# Patient Record
Sex: MALE | URBAN - METROPOLITAN AREA
[De-identification: names, ages, dates, MRNs, and addresses within clinical notes are randomized per-mention and may not be internally consistent; named-entity substitution may affect disease eponyms.]

---

## 2020-03-23 ENCOUNTER — APPOINTMENT (RX ONLY)
Dept: URBAN - METROPOLITAN AREA CLINIC 114 | Facility: CLINIC | Age: 55
Setting detail: DERMATOLOGY
End: 2020-03-23

## 2020-03-23 DIAGNOSIS — L40.0 PSORIASIS VULGARIS: ICD-10-CM

## 2020-03-23 PROCEDURE — 99242 OFF/OP CONSLTJ NEW/EST SF 20: CPT

## 2020-03-23 PROCEDURE — ? COUNSELING

## 2020-03-23 PROCEDURE — ? DIAGNOSIS COMMENT

## 2020-03-23 ASSESSMENT — LOCATION ZONE DERM: LOCATION ZONE: HAND

## 2020-03-23 ASSESSMENT — LOCATION SIMPLE DESCRIPTION DERM
LOCATION SIMPLE: LEFT HAND
LOCATION SIMPLE: RIGHT HAND

## 2020-03-23 ASSESSMENT — LOCATION DETAILED DESCRIPTION DERM
LOCATION DETAILED: LEFT RADIAL PALM
LOCATION DETAILED: RIGHT RADIAL PALM

## 2020-03-23 NOTE — PROCEDURE: DIAGNOSIS COMMENT
Comment: VS psoriasis. Pt reports chronic back pain, but work up negative for psoriatic arthritis. Pt uses previously prescribed clobetasol ointment on hands with occlusion at night. Will start  tar ointment, handout provided. Pt specifically sent for possible XTRAC and will obtain approval but advised due to COVID-19, may be delayed.
Detail Level: Simple

## 2020-03-23 NOTE — PROCEDURE: MIPS QUALITY
Quality 431: Preventive Care And Screening: Unhealthy Alcohol Use - Screening: Patient screened for unhealthy alcohol use using a single question and scores less than 2 times per year
Quality 130: Documentation Of Current Medications In The Medical Record: Current Medications Documented
Quality 226: Preventive Care And Screening: Tobacco Use: Screening And Cessation Intervention: Patient screened for tobacco use and is an ex/non-smoker
Detail Level: Detailed
Quality 110: Preventive Care And Screening: Influenza Immunization: Influenza Immunization previously received during influenza season
Quality 131: Pain Assessment And Follow-Up: Pain assessment using a standardized tool is documented as negative, no follow-up plan required

## 2020-09-04 ENCOUNTER — APPOINTMENT (RX ONLY)
Dept: URBAN - METROPOLITAN AREA CLINIC 114 | Facility: CLINIC | Age: 55
Setting detail: DERMATOLOGY
End: 2020-09-04

## 2020-09-04 DIAGNOSIS — L40.0 PSORIASIS VULGARIS: ICD-10-CM

## 2020-09-04 PROCEDURE — ? EXCIMER LASER

## 2020-09-04 PROCEDURE — 96920 EXCIMER LSR PSRIASIS<250SQCM: CPT

## 2020-09-04 ASSESSMENT — LOCATION DETAILED DESCRIPTION DERM: LOCATION DETAILED: LEFT ULNAR DORSAL HAND

## 2020-09-04 ASSESSMENT — LOCATION ZONE DERM: LOCATION ZONE: HAND

## 2020-09-04 ASSESSMENT — LOCATION SIMPLE DESCRIPTION DERM: LOCATION SIMPLE: LEFT HAND

## 2020-09-04 NOTE — PROCEDURE: EXCIMER LASER
Total Square Area In Cm2 (Required For Proper Billing- Whole Numbers Only Please): 156
Post-Care Instructions: I reviewed with the patient in detail post-care instructions. Patient should stay away from the sun and wear sun protection until treated areas are fully healed.
Location #1: fingertips, fingernails
Spot Size: 2 x 2 cm
Comments: [id number]
Fluence Units: mJ/cm2
Detail Level: Detailed
Consent: Written consent obtained, risks reviewed including but not limited to crusting, scabbing, blistering, scarring, darker or lighter pigmentary change, incidental hair removal, bruising, and/or incomplete removal. Patient has a chronic skin condition and failure to continue treatments may lead to localized complications and/or the need for systemic immunosuppressants. Mask worn by both patient and staff. Patient screened negative for COVID-19 symptoms.
Treatment Number: 1
Mode: repeat paint
Fluence #1 (J/Cm2 Or Mj/Cm2): 300

## 2020-09-09 ENCOUNTER — APPOINTMENT (RX ONLY)
Dept: URBAN - METROPOLITAN AREA CLINIC 114 | Facility: CLINIC | Age: 55
Setting detail: DERMATOLOGY
End: 2020-09-09

## 2020-09-09 DIAGNOSIS — L40.0 PSORIASIS VULGARIS: ICD-10-CM

## 2020-09-09 PROCEDURE — ? EXCIMER LASER

## 2020-09-09 PROCEDURE — 96920 EXCIMER LSR PSRIASIS<250SQCM: CPT

## 2020-09-09 ASSESSMENT — LOCATION SIMPLE DESCRIPTION DERM: LOCATION SIMPLE: LEFT HAND

## 2020-09-09 ASSESSMENT — LOCATION ZONE DERM: LOCATION ZONE: HAND

## 2020-09-09 ASSESSMENT — LOCATION DETAILED DESCRIPTION DERM: LOCATION DETAILED: LEFT ULNAR DORSAL HAND

## 2020-09-09 NOTE — PROCEDURE: EXCIMER LASER
Total Square Area In Cm2 (Required For Proper Billing- Whole Numbers Only Please): 156
Post-Care Instructions: I reviewed with the patient in detail post-care instructions. Patient should stay away from the sun and wear sun protection until treated areas are fully healed.
Location #1: fingertips, fingernails (+10%)
Spot Size: 2 x 2 cm
Comments: [id number]
Fluence Units: mJ/cm2
Detail Level: Detailed
Consent: Risks reviewed including but not limited to crusting, scabbing, blistering, scarring, darker or lighter pigmentary change, incidental hair removal, bruising, and/or incomplete removal. Patient has a chronic skin condition and failure to continue treatments may lead to localized complications and/or the need for systemic immunosuppressants. Mask worn by both patient and staff. Patient screened negative for COVID-19 symptoms.
Treatment Number: 2
Mode: repeat paint
Fluence #1 (J/Cm2 Or Mj/Cm2): 330

## 2020-09-11 ENCOUNTER — APPOINTMENT (RX ONLY)
Dept: URBAN - METROPOLITAN AREA CLINIC 114 | Facility: CLINIC | Age: 55
Setting detail: DERMATOLOGY
End: 2020-09-11

## 2020-09-11 DIAGNOSIS — L40.0 PSORIASIS VULGARIS: ICD-10-CM

## 2020-09-11 PROCEDURE — 96920 EXCIMER LSR PSRIASIS<250SQCM: CPT

## 2020-09-11 PROCEDURE — ? EXCIMER LASER

## 2020-09-11 ASSESSMENT — LOCATION ZONE DERM: LOCATION ZONE: HAND

## 2020-09-11 ASSESSMENT — LOCATION DETAILED DESCRIPTION DERM: LOCATION DETAILED: LEFT ULNAR DORSAL HAND

## 2020-09-11 ASSESSMENT — LOCATION SIMPLE DESCRIPTION DERM: LOCATION SIMPLE: LEFT HAND

## 2020-09-11 NOTE — PROCEDURE: EXCIMER LASER
Total Square Area In Cm2 (Required For Proper Billing- Whole Numbers Only Please): 156
Post-Care Instructions: I reviewed with the patient in detail post-care instructions. Patient should stay away from the sun and wear sun protection until treated areas are fully healed.
Location #1: fingertips, fingernails (+10%)
Spot Size: 2 x 2 cm
Comments: [id number]
Fluence Units: mJ/cm2
Detail Level: Detailed
Consent: Risks reviewed including but not limited to crusting, scabbing, blistering, scarring, darker or lighter pigmentary change, incidental hair removal, bruising, and/or incomplete removal. Patient has a chronic skin condition and failure to continue treatments may lead to localized complications and/or the need for systemic immunosuppressants. Mask worn by both patient and staff. Patient screened negative for COVID-19 symptoms.
Treatment Number: 3
Mode: repeat paint
Fluence #1 (J/Cm2 Or Mj/Cm2): 363

## 2020-09-15 ENCOUNTER — APPOINTMENT (RX ONLY)
Dept: URBAN - METROPOLITAN AREA CLINIC 114 | Facility: CLINIC | Age: 55
Setting detail: DERMATOLOGY
End: 2020-09-15

## 2020-09-15 DIAGNOSIS — L40.0 PSORIASIS VULGARIS: ICD-10-CM

## 2020-09-15 PROCEDURE — 96920 EXCIMER LSR PSRIASIS<250SQCM: CPT

## 2020-09-15 PROCEDURE — ? EXCIMER LASER

## 2020-09-15 ASSESSMENT — LOCATION SIMPLE DESCRIPTION DERM: LOCATION SIMPLE: LEFT HAND

## 2020-09-15 ASSESSMENT — LOCATION DETAILED DESCRIPTION DERM: LOCATION DETAILED: LEFT ULNAR DORSAL HAND

## 2020-09-15 ASSESSMENT — LOCATION ZONE DERM: LOCATION ZONE: HAND

## 2020-09-15 NOTE — PROCEDURE: EXCIMER LASER
Total Square Area In Cm2 (Required For Proper Billing- Whole Numbers Only Please): 156
Post-Care Instructions: I reviewed with the patient in detail post-care instructions. Patient should stay away from the sun and wear sun protection until treated areas are fully healed.
Location #1: fingertips, fingernails (+10%)
Spot Size: 2 x 2 cm
Comments: [id number]
Fluence Units: mJ/cm2
Detail Level: Detailed
Consent: Risks reviewed including but not limited to crusting, scabbing, blistering, scarring, darker or lighter pigmentary change, incidental hair removal, bruising, and/or incomplete removal. Patient has a chronic skin condition and failure to continue treatments may lead to localized complications and/or the need for systemic immunosuppressants. Mask worn by both patient and staff. Patient screened negative for COVID-19 symptoms.
Treatment Number: 4
Mode: repeat paint
Fluence #1 (J/Cm2 Or Mj/Cm2): 399

## 2020-09-17 ENCOUNTER — APPOINTMENT (RX ONLY)
Dept: URBAN - METROPOLITAN AREA CLINIC 114 | Facility: CLINIC | Age: 55
Setting detail: DERMATOLOGY
End: 2020-09-17

## 2020-09-17 DIAGNOSIS — L40.0 PSORIASIS VULGARIS: ICD-10-CM

## 2020-09-17 PROCEDURE — ? EXCIMER LASER

## 2020-09-17 PROCEDURE — 96920 EXCIMER LSR PSRIASIS<250SQCM: CPT

## 2020-09-17 ASSESSMENT — LOCATION ZONE DERM: LOCATION ZONE: HAND

## 2020-09-17 ASSESSMENT — LOCATION SIMPLE DESCRIPTION DERM: LOCATION SIMPLE: LEFT HAND

## 2020-09-17 ASSESSMENT — LOCATION DETAILED DESCRIPTION DERM: LOCATION DETAILED: LEFT ULNAR DORSAL HAND

## 2020-09-17 NOTE — PROCEDURE: EXCIMER LASER
Total Square Area In Cm2 (Required For Proper Billing- Whole Numbers Only Please): 156
Post-Care Instructions: I reviewed with the patient in detail post-care instructions. Patient should stay away from the sun and wear sun protection until treated areas are fully healed.
Location #1: fingertips, fingernails (+10%)
Spot Size: 2 x 2 cm
Comments: [email or web address]
Fluence Units: mJ/cm2
Detail Level: Detailed
Consent: Risks reviewed including but not limited to crusting, scabbing, blistering, scarring, darker or lighter pigmentary change, incidental hair removal, bruising, and/or incomplete removal. Patient has a chronic skin condition and failure to continue treatments may lead to localized complications and/or the need for systemic immunosuppressants. Mask worn by both patient and staff. Patient screened negative for COVID-19 symptoms.
Treatment Number: 5
Mode: repeat paint
Fluence #1 (J/Cm2 Or Mj/Cm2): 432

## 2020-09-22 ENCOUNTER — APPOINTMENT (RX ONLY)
Dept: URBAN - METROPOLITAN AREA CLINIC 114 | Facility: CLINIC | Age: 55
Setting detail: DERMATOLOGY
End: 2020-09-22

## 2020-09-22 DIAGNOSIS — L40.0 PSORIASIS VULGARIS: ICD-10-CM

## 2020-09-22 PROCEDURE — ? EXCIMER LASER

## 2020-09-22 PROCEDURE — 96920 EXCIMER LSR PSRIASIS<250SQCM: CPT

## 2020-09-22 ASSESSMENT — LOCATION DETAILED DESCRIPTION DERM: LOCATION DETAILED: LEFT ULNAR DORSAL HAND

## 2020-09-22 ASSESSMENT — LOCATION SIMPLE DESCRIPTION DERM: LOCATION SIMPLE: LEFT HAND

## 2020-09-22 ASSESSMENT — LOCATION ZONE DERM: LOCATION ZONE: HAND

## 2020-09-22 NOTE — PROCEDURE: EXCIMER LASER
Total Square Area In Cm2 (Required For Proper Billing- Whole Numbers Only Please): 156
Post-Care Instructions: I reviewed with the patient in detail post-care instructions. Patient should stay away from the sun and wear sun protection until treated areas are fully healed.
Location #1: fingertips, fingernails (+10%)
Spot Size: 2 x 2 cm
Comments: [email or web address]
Fluence Units: mJ/cm2
Detail Level: Detailed
Consent: Risks reviewed including but not limited to crusting, scabbing, blistering, scarring, darker or lighter pigmentary change, incidental hair removal, bruising, and/or incomplete removal. Patient has a chronic skin condition and failure to continue treatments may lead to localized complications and/or the need for systemic immunosuppressants. Mask worn by both patient and staff. Patient screened negative for COVID-19 symptoms.
Treatment Number: 6
Mode: repeat paint
Fluence #1 (J/Cm2 Or Mj/Cm2): 483

## 2020-09-24 ENCOUNTER — APPOINTMENT (RX ONLY)
Dept: URBAN - METROPOLITAN AREA CLINIC 114 | Facility: CLINIC | Age: 55
Setting detail: DERMATOLOGY
End: 2020-09-24

## 2020-09-24 DIAGNOSIS — L40.0 PSORIASIS VULGARIS: ICD-10-CM

## 2020-09-24 PROCEDURE — ? EXCIMER LASER

## 2020-09-24 PROCEDURE — 96920 EXCIMER LSR PSRIASIS<250SQCM: CPT

## 2020-09-24 ASSESSMENT — LOCATION DETAILED DESCRIPTION DERM: LOCATION DETAILED: LEFT ULNAR DORSAL HAND

## 2020-09-24 ASSESSMENT — LOCATION SIMPLE DESCRIPTION DERM: LOCATION SIMPLE: LEFT HAND

## 2020-09-24 ASSESSMENT — LOCATION ZONE DERM: LOCATION ZONE: HAND

## 2020-09-24 NOTE — PROCEDURE: EXCIMER LASER
Total Square Area In Cm2 (Required For Proper Billing- Whole Numbers Only Please): 156
Post-Care Instructions: I reviewed with the patient in detail post-care instructions. Patient should stay away from the sun and wear sun protection until treated areas are fully healed.
Location #1: fingertips, fingernails (+10%)
Spot Size: 2 x 2 cm
Comments: [email or web address]
Fluence Units: mJ/cm2
Detail Level: Detailed
Consent: Risks reviewed including but not limited to crusting, scabbing, blistering, scarring, darker or lighter pigmentary change, incidental hair removal, bruising, and/or incomplete removal. Patient has a chronic skin condition and failure to continue treatments may lead to localized complications and/or the need for systemic immunosuppressants. Mask worn by both patient and staff. Patient screened negative for COVID-19 symptoms.
Treatment Number: 7
Mode: repeat paint
Fluence #1 (J/Cm2 Or Mj/Cm2): 530

## 2020-09-29 ENCOUNTER — APPOINTMENT (RX ONLY)
Dept: URBAN - METROPOLITAN AREA CLINIC 114 | Facility: CLINIC | Age: 55
Setting detail: DERMATOLOGY
End: 2020-09-29

## 2020-09-29 DIAGNOSIS — L40.0 PSORIASIS VULGARIS: ICD-10-CM

## 2020-09-29 PROCEDURE — 96920 EXCIMER LSR PSRIASIS<250SQCM: CPT

## 2020-09-29 PROCEDURE — ? EXCIMER LASER

## 2020-09-29 ASSESSMENT — LOCATION ZONE DERM: LOCATION ZONE: HAND

## 2020-09-29 ASSESSMENT — LOCATION SIMPLE DESCRIPTION DERM: LOCATION SIMPLE: LEFT HAND

## 2020-09-29 ASSESSMENT — LOCATION DETAILED DESCRIPTION DERM: LOCATION DETAILED: LEFT ULNAR DORSAL HAND

## 2020-09-29 NOTE — PROCEDURE: EXCIMER LASER
Total Square Area In Cm2 (Required For Proper Billing- Whole Numbers Only Please): 156
Post-Care Instructions: I reviewed with the patient in detail post-care instructions. Patient should stay away from the sun and wear sun protection until treated areas are fully healed.
Location #1: fingertips, fingernails (+10%)
Spot Size: 2 x 2 cm
Comments: [email or web address]
Fluence Units: mJ/cm2
Detail Level: Detailed
Consent: Risks reviewed including but not limited to crusting, scabbing, blistering, scarring, darker or lighter pigmentary change, incidental hair removal, bruising, and/or incomplete removal. Patient has a chronic skin condition and failure to continue treatments may lead to localized complications and/or the need for systemic immunosuppressants. Mask worn by both patient and staff. Patient screened negative for COVID-19 symptoms.
Treatment Number: 8
Mode: repeat paint
Fluence #1 (J/Cm2 Or Mj/Cm2): 580

## 2020-10-01 ENCOUNTER — APPOINTMENT (RX ONLY)
Dept: URBAN - METROPOLITAN AREA CLINIC 114 | Facility: CLINIC | Age: 55
Setting detail: DERMATOLOGY
End: 2020-10-01

## 2020-10-01 DIAGNOSIS — L40.0 PSORIASIS VULGARIS: ICD-10-CM

## 2020-10-01 PROCEDURE — 96920 EXCIMER LSR PSRIASIS<250SQCM: CPT

## 2020-10-01 PROCEDURE — ? EXCIMER LASER

## 2020-10-01 ASSESSMENT — LOCATION ZONE DERM: LOCATION ZONE: HAND

## 2020-10-01 ASSESSMENT — LOCATION SIMPLE DESCRIPTION DERM: LOCATION SIMPLE: LEFT HAND

## 2020-10-01 ASSESSMENT — LOCATION DETAILED DESCRIPTION DERM: LOCATION DETAILED: LEFT ULNAR DORSAL HAND

## 2020-10-06 ENCOUNTER — APPOINTMENT (RX ONLY)
Dept: URBAN - METROPOLITAN AREA CLINIC 114 | Facility: CLINIC | Age: 55
Setting detail: DERMATOLOGY
End: 2020-10-06

## 2020-10-06 DIAGNOSIS — L40.0 PSORIASIS VULGARIS: ICD-10-CM

## 2020-10-06 PROCEDURE — ? EXCIMER LASER

## 2020-10-06 PROCEDURE — 96920 EXCIMER LSR PSRIASIS<250SQCM: CPT

## 2020-10-06 ASSESSMENT — LOCATION ZONE DERM: LOCATION ZONE: HAND

## 2020-10-06 ASSESSMENT — LOCATION SIMPLE DESCRIPTION DERM: LOCATION SIMPLE: LEFT HAND

## 2020-10-06 ASSESSMENT — LOCATION DETAILED DESCRIPTION DERM: LOCATION DETAILED: LEFT ULNAR DORSAL HAND

## 2020-10-06 NOTE — PROCEDURE: EXCIMER LASER
Total Square Area In Cm2 (Required For Proper Billing- Whole Numbers Only Please): 156
Post-Care Instructions: I reviewed with the patient in detail post-care instructions. Patient should stay away from the sun and wear sun protection until treated areas are fully healed.
Location #1: fingertips, fingernails (+10%)
Spot Size: 2 x 2 cm
Comments: [email or web address]
Fluence Units: mJ/cm2
Detail Level: Detailed
Consent: Risks reviewed including but not limited to crusting, scabbing, blistering, scarring, darker or lighter pigmentary change, incidental hair removal, bruising, and/or incomplete removal. Patient has a chronic skin condition and failure to continue treatments may lead to localized complications and/or the need for systemic immunosuppressants. Mask worn by both patient and staff. Patient screened negative for COVID-19 symptoms.
Treatment Number: 11
Mode: repeat paint
Fluence #1 (J/Cm2 Or Mj/Cm2): 700

## 2020-10-08 ENCOUNTER — APPOINTMENT (RX ONLY)
Dept: URBAN - METROPOLITAN AREA CLINIC 114 | Facility: CLINIC | Age: 55
Setting detail: DERMATOLOGY
End: 2020-10-08

## 2020-10-08 DIAGNOSIS — L40.0 PSORIASIS VULGARIS: ICD-10-CM

## 2020-10-08 PROCEDURE — ? EXCIMER LASER

## 2020-10-08 PROCEDURE — 96920 EXCIMER LSR PSRIASIS<250SQCM: CPT

## 2020-10-08 ASSESSMENT — LOCATION DETAILED DESCRIPTION DERM: LOCATION DETAILED: LEFT ULNAR DORSAL HAND

## 2020-10-08 ASSESSMENT — LOCATION SIMPLE DESCRIPTION DERM: LOCATION SIMPLE: LEFT HAND

## 2020-10-08 ASSESSMENT — LOCATION ZONE DERM: LOCATION ZONE: HAND

## 2020-10-08 NOTE — PROCEDURE: EXCIMER LASER
Total Square Area In Cm2 (Required For Proper Billing- Whole Numbers Only Please): 156
Post-Care Instructions: I reviewed with the patient in detail post-care instructions. Patient should stay away from the sun and wear sun protection until treated areas are fully healed.
Location #1: fingertips, fingernails (+5%)
Spot Size: 2 x 2 cm
Comments: [email or web address]
Fluence Units: mJ/cm2
Detail Level: Detailed
Consent: Risks reviewed including but not limited to crusting, scabbing, blistering, scarring, darker or lighter pigmentary change, incidental hair removal, bruising, and/or incomplete removal. Patient has a chronic skin condition and failure to continue treatments may lead to localized complications and/or the need for systemic immunosuppressants. Mask worn by both patient and staff. Patient screened negative for COVID-19 symptoms.
Treatment Number: 12
Mode: repeat paint
Fluence #1 (J/Cm2 Or Mj/Cm2): 749

## 2020-10-13 ENCOUNTER — APPOINTMENT (RX ONLY)
Dept: URBAN - METROPOLITAN AREA CLINIC 114 | Facility: CLINIC | Age: 55
Setting detail: DERMATOLOGY
End: 2020-10-13

## 2020-10-13 DIAGNOSIS — L40.0 PSORIASIS VULGARIS: ICD-10-CM

## 2020-10-13 PROCEDURE — ? EXCIMER LASER

## 2020-10-13 PROCEDURE — 96920 EXCIMER LSR PSRIASIS<250SQCM: CPT

## 2020-10-13 ASSESSMENT — LOCATION SIMPLE DESCRIPTION DERM: LOCATION SIMPLE: LEFT HAND

## 2020-10-13 ASSESSMENT — LOCATION DETAILED DESCRIPTION DERM: LOCATION DETAILED: LEFT ULNAR DORSAL HAND

## 2020-10-13 ASSESSMENT — LOCATION ZONE DERM: LOCATION ZONE: HAND

## 2020-10-13 NOTE — PROCEDURE: EXCIMER LASER
Total Square Area In Cm2 (Required For Proper Billing- Whole Numbers Only Please): 156
Post-Care Instructions: I reviewed with the patient in detail post-care instructions. Patient should stay away from the sun and wear sun protection until treated areas are fully healed.
Location #1: fingertips, fingernails (+10%)
Spot Size: 2 x 2 cm
Comments: [id number]
Fluence Units: mJ/cm2
Detail Level: Detailed
Consent: Risks reviewed including but not limited to crusting, scabbing, blistering, scarring, darker or lighter pigmentary change, incidental hair removal, bruising, and/or incomplete removal. Patient has a chronic skin condition and failure to continue treatments may lead to localized complications and/or the need for systemic immunosuppressants. Mask worn by both patient and staff. Patient screened negative for COVID-19 symptoms.
Treatment Number: 13
Mode: repeat paint
Fluence #1 (J/Cm2 Or Mj/Cm2): 810

## 2020-10-15 ENCOUNTER — APPOINTMENT (RX ONLY)
Dept: URBAN - METROPOLITAN AREA CLINIC 114 | Facility: CLINIC | Age: 55
Setting detail: DERMATOLOGY
End: 2020-10-15

## 2020-10-15 DIAGNOSIS — L40.0 PSORIASIS VULGARIS: ICD-10-CM

## 2020-10-15 PROCEDURE — 96920 EXCIMER LSR PSRIASIS<250SQCM: CPT

## 2020-10-15 PROCEDURE — ? EXCIMER LASER

## 2020-10-15 ASSESSMENT — LOCATION ZONE DERM: LOCATION ZONE: HAND

## 2020-10-15 ASSESSMENT — LOCATION DETAILED DESCRIPTION DERM: LOCATION DETAILED: LEFT ULNAR DORSAL HAND

## 2020-10-15 ASSESSMENT — LOCATION SIMPLE DESCRIPTION DERM: LOCATION SIMPLE: LEFT HAND

## 2020-10-15 NOTE — PROCEDURE: EXCIMER LASER
Total Square Area In Cm2 (Required For Proper Billing- Whole Numbers Only Please): 156
Post-Care Instructions: I reviewed with the patient in detail post-care instructions. Patient should stay away from the sun and wear sun protection until treated areas are fully healed.
Location #1: fingertips, fingernails
Spot Size: 2 x 2 cm
Comments: [email or web address]
Fluence Units: mJ/cm2
Detail Level: Detailed
Consent: Risks reviewed including but not limited to crusting, scabbing, blistering, scarring, darker or lighter pigmentary change, incidental hair removal, bruising, and/or incomplete removal. Patient has a chronic skin condition and failure to continue treatments may lead to localized complications and/or the need for systemic immunosuppressants. Mask worn by both patient and staff. Patient screened negative for COVID-19 symptoms.
Treatment Number: 14
Mode: repeat paint
Fluence #1 (J/Cm2 Or Mj/Cm2): 816

## 2020-10-20 ENCOUNTER — APPOINTMENT (RX ONLY)
Dept: URBAN - METROPOLITAN AREA CLINIC 114 | Facility: CLINIC | Age: 55
Setting detail: DERMATOLOGY
End: 2020-10-20

## 2020-10-20 DIAGNOSIS — L40.0 PSORIASIS VULGARIS: ICD-10-CM

## 2020-10-20 PROCEDURE — ? EXCIMER LASER

## 2020-10-20 PROCEDURE — 96920 EXCIMER LSR PSRIASIS<250SQCM: CPT

## 2020-10-20 ASSESSMENT — LOCATION SIMPLE DESCRIPTION DERM: LOCATION SIMPLE: LEFT HAND

## 2020-10-20 ASSESSMENT — LOCATION DETAILED DESCRIPTION DERM: LOCATION DETAILED: LEFT ULNAR DORSAL HAND

## 2020-10-20 ASSESSMENT — LOCATION ZONE DERM: LOCATION ZONE: HAND

## 2020-10-20 NOTE — PROCEDURE: EXCIMER LASER
Total Square Area In Cm2 (Required For Proper Billing- Whole Numbers Only Please): 156
Post-Care Instructions: I reviewed with the patient in detail post-care instructions. Patient should stay away from the sun and wear sun protection until treated areas are fully healed.
Location #1: fingertips, fingernails
Spot Size: 2 x 2 cm
Comments: [email or web address]
Fluence Units: mJ/cm2
Detail Level: Detailed
Consent: Risks reviewed including but not limited to crusting, scabbing, blistering, scarring, darker or lighter pigmentary change, incidental hair removal, bruising, and/or incomplete removal. Patient has a chronic skin condition and failure to continue treatments may lead to localized complications and/or the need for systemic immunosuppressants. Mask worn by both patient and staff. Patient screened negative for COVID-19 symptoms.
Treatment Number: 15
Mode: repeat paint
Fluence #1 (J/Cm2 Or Mj/Cm2): 81

## 2020-10-22 ENCOUNTER — APPOINTMENT (RX ONLY)
Dept: URBAN - METROPOLITAN AREA CLINIC 114 | Facility: CLINIC | Age: 55
Setting detail: DERMATOLOGY
End: 2020-10-22

## 2020-10-22 DIAGNOSIS — L40.0 PSORIASIS VULGARIS: ICD-10-CM

## 2020-10-22 PROCEDURE — ? EXCIMER LASER

## 2020-10-22 PROCEDURE — 96920 EXCIMER LSR PSRIASIS<250SQCM: CPT

## 2020-10-22 ASSESSMENT — LOCATION ZONE DERM: LOCATION ZONE: HAND

## 2020-10-22 ASSESSMENT — LOCATION DETAILED DESCRIPTION DERM: LOCATION DETAILED: LEFT ULNAR DORSAL HAND

## 2020-10-22 ASSESSMENT — LOCATION SIMPLE DESCRIPTION DERM: LOCATION SIMPLE: LEFT HAND

## 2020-10-22 NOTE — PROCEDURE: EXCIMER LASER
Total Square Area In Cm2 (Required For Proper Billing- Whole Numbers Only Please): 156
Post-Care Instructions: I reviewed with the patient in detail post-care instructions. Patient should stay away from the sun and wear sun protection until treated areas are fully healed.
Location #1: fingertips, fingernails (+5%)
Spot Size: 2 x 2 cm
Comments: [id number]
Fluence Units: mJ/cm2
Detail Level: Detailed
Consent: Risks reviewed including but not limited to crusting, scabbing, blistering, scarring, darker or lighter pigmentary change, incidental hair removal, bruising, and/or incomplete removal. Patient has a chronic skin condition and failure to continue treatments may lead to localized complications and/or the need for systemic immunosuppressants. Mask worn by both patient and staff. Patient screened negative for COVID-19 symptoms.
Treatment Number: 16
Mode: repeat paint
Fluence #1 (J/Cm2 Or Mj/Cm2): 855

## 2020-10-27 ENCOUNTER — APPOINTMENT (RX ONLY)
Dept: URBAN - METROPOLITAN AREA CLINIC 114 | Facility: CLINIC | Age: 55
Setting detail: DERMATOLOGY
End: 2020-10-27

## 2020-10-27 DIAGNOSIS — L40.0 PSORIASIS VULGARIS: ICD-10-CM

## 2020-10-27 PROCEDURE — ? EXCIMER LASER

## 2020-10-27 PROCEDURE — 96920 EXCIMER LSR PSRIASIS<250SQCM: CPT

## 2020-10-27 ASSESSMENT — LOCATION SIMPLE DESCRIPTION DERM: LOCATION SIMPLE: LEFT HAND

## 2020-10-27 ASSESSMENT — LOCATION DETAILED DESCRIPTION DERM: LOCATION DETAILED: LEFT ULNAR DORSAL HAND

## 2020-10-27 ASSESSMENT — LOCATION ZONE DERM: LOCATION ZONE: HAND

## 2020-10-27 NOTE — PROCEDURE: EXCIMER LASER
Total Square Area In Cm2 (Required For Proper Billing- Whole Numbers Only Please): 156
Post-Care Instructions: I reviewed with the patient in detail post-care instructions. Patient should stay away from the sun and wear sun protection until treated areas are fully healed.
Location #1: fingertips, fingernails (+5%)
Spot Size: 2 x 2 cm
Comments: [email or web address]
Fluence Units: mJ/cm2
Detail Level: Detailed
Consent: Risks reviewed including but not limited to crusting, scabbing, blistering, scarring, darker or lighter pigmentary change, incidental hair removal, bruising, and/or incomplete removal. Patient has a chronic skin condition and failure to continue treatments may lead to localized complications and/or the need for systemic immunosuppressants. Mask worn by both patient and staff. Patient screened negative for COVID-19 symptoms.
Treatment Number: 17
Mode: repeat paint
Fluence #1 (J/Cm2 Or Mj/Cm2): 893

## 2020-10-29 ENCOUNTER — APPOINTMENT (RX ONLY)
Dept: URBAN - METROPOLITAN AREA CLINIC 114 | Facility: CLINIC | Age: 55
Setting detail: DERMATOLOGY
End: 2020-10-29

## 2020-10-29 DIAGNOSIS — L40.0 PSORIASIS VULGARIS: ICD-10-CM

## 2020-10-29 PROCEDURE — 96920 EXCIMER LSR PSRIASIS<250SQCM: CPT

## 2020-10-29 PROCEDURE — ? EXCIMER LASER

## 2020-10-29 ASSESSMENT — LOCATION ZONE DERM: LOCATION ZONE: HAND

## 2020-10-29 ASSESSMENT — LOCATION SIMPLE DESCRIPTION DERM: LOCATION SIMPLE: LEFT HAND

## 2020-10-29 ASSESSMENT — LOCATION DETAILED DESCRIPTION DERM: LOCATION DETAILED: LEFT ULNAR DORSAL HAND

## 2020-10-29 NOTE — PROCEDURE: EXCIMER LASER
Total Square Area In Cm2 (Required For Proper Billing- Whole Numbers Only Please): 156
Post-Care Instructions: I reviewed with the patient in detail post-care instructions. Patient should stay away from the sun and wear sun protection until treated areas are fully healed.
Location #1: fingertips, fingernails (+5%)
Spot Size: 2 x 2 cm
Comments: [email or web address]
Fluence Units: mJ/cm2
Detail Level: Detailed
Consent: Risks reviewed including but not limited to crusting, scabbing, blistering, scarring, darker or lighter pigmentary change, incidental hair removal, bruising, and/or incomplete removal. Patient has a chronic skin condition and failure to continue treatments may lead to localized complications and/or the need for systemic immunosuppressants. Mask worn by both patient and staff. Patient screened negative for COVID-19 symptoms.
Treatment Number: 18
Mode: repeat paint
Fluence #1 (J/Cm2 Or Mj/Cm2): 944

## 2020-11-03 ENCOUNTER — APPOINTMENT (RX ONLY)
Dept: URBAN - METROPOLITAN AREA CLINIC 114 | Facility: CLINIC | Age: 55
Setting detail: DERMATOLOGY
End: 2020-11-03

## 2020-11-03 DIAGNOSIS — L40.0 PSORIASIS VULGARIS: ICD-10-CM

## 2020-11-03 PROCEDURE — ? EXCIMER LASER

## 2020-11-03 PROCEDURE — 96920 EXCIMER LSR PSRIASIS<250SQCM: CPT

## 2020-11-03 ASSESSMENT — LOCATION ZONE DERM: LOCATION ZONE: HAND

## 2020-11-03 ASSESSMENT — LOCATION SIMPLE DESCRIPTION DERM: LOCATION SIMPLE: LEFT HAND

## 2020-11-03 ASSESSMENT — LOCATION DETAILED DESCRIPTION DERM: LOCATION DETAILED: LEFT ULNAR DORSAL HAND

## 2020-11-03 NOTE — PROCEDURE: EXCIMER LASER
Total Square Area In Cm2 (Required For Proper Billing- Whole Numbers Only Please): 156
Post-Care Instructions: I reviewed with the patient in detail post-care instructions. Patient should stay away from the sun and wear sun protection until treated areas are fully healed.
Location #1: fingertips, fingernails (+10%)
Spot Size: 2 x 2 cm
Comments: [id number]
Fluence Units: mJ/cm2
Detail Level: Detailed
Consent: Risks reviewed including but not limited to crusting, scabbing, blistering, scarring, darker or lighter pigmentary change, incidental hair removal, bruising, and/or incomplete removal. Patient has a chronic skin condition and failure to continue treatments may lead to localized complications and/or the need for systemic immunosuppressants. Mask worn by both patient and staff. Patient screened negative for COVID-19 symptoms.
Treatment Number: 19
Mode: repeat paint
Fluence #1 (J/Cm2 Or Mj/Cm2): 1038

## 2021-11-17 NOTE — PROCEDURE: EXCIMER LASER
Total Square Area In Cm2 (Required For Proper Billing- Whole Numbers Only Please): 156
Post-Care Instructions: I reviewed with the patient in detail post-care instructions. Patient should stay away from the sun and wear sun protection until treated areas are fully healed.
Location #1: fingertips, fingernails (+10%)
Spot Size: 2 x 2 cm
Comments: [id number]
Fluence Units: mJ/cm2
Detail Level: Detailed
Consent: Risks reviewed including but not limited to crusting, scabbing, blistering, scarring, darker or lighter pigmentary change, incidental hair removal, bruising, and/or incomplete removal. Patient has a chronic skin condition and failure to continue treatments may lead to localized complications and/or the need for systemic immunosuppressants. Mask worn by both patient and staff. Patient screened negative for COVID-19 symptoms.
Treatment Number: 9
Mode: repeat paint
Fluence #1 (J/Cm2 Or Mj/Cm2): 598
stated